# Patient Record
Sex: FEMALE | Race: WHITE | NOT HISPANIC OR LATINO | Employment: OTHER | URBAN - METROPOLITAN AREA
[De-identification: names, ages, dates, MRNs, and addresses within clinical notes are randomized per-mention and may not be internally consistent; named-entity substitution may affect disease eponyms.]

---

## 2017-10-27 PROBLEM — R13.10 DYSPHAGIA: Status: ACTIVE | Noted: 2017-10-27

## 2017-10-27 PROBLEM — I10 HYPERTENSION: Status: ACTIVE | Noted: 2017-10-27

## 2017-10-27 PROBLEM — I48.91 NEW ONSET ATRIAL FIBRILLATION: Status: ACTIVE | Noted: 2017-10-27

## 2017-10-27 PROBLEM — M54.2 CERVICALGIA: Status: ACTIVE | Noted: 2017-10-27

## 2017-11-27 PROBLEM — R94.39 ABNORMAL MYOCARDIAL PERFUSION STUDY: Status: ACTIVE | Noted: 2017-11-27

## 2019-04-26 ENCOUNTER — OFFICE VISIT (OUTPATIENT)
Dept: OPHTHALMOLOGY | Facility: CLINIC | Age: 84
End: 2019-04-26
Payer: MEDICARE

## 2019-04-26 DIAGNOSIS — B00.52 HERPES SIMPLEX VIRUS STROMAL KERATITIS: Primary | ICD-10-CM

## 2019-04-26 PROCEDURE — 99999 PR PBB SHADOW E&M-NEW PATIENT-LVL II: ICD-10-PCS | Mod: PBBFAC,,, | Performed by: OPHTHALMOLOGY

## 2019-04-26 PROCEDURE — 92004 COMPRE OPH EXAM NEW PT 1/>: CPT | Mod: S$PBB,,, | Performed by: OPHTHALMOLOGY

## 2019-04-26 PROCEDURE — 99202 OFFICE O/P NEW SF 15 MIN: CPT | Mod: PBBFAC | Performed by: OPHTHALMOLOGY

## 2019-04-26 PROCEDURE — 99999 PR PBB SHADOW E&M-NEW PATIENT-LVL II: CPT | Mod: PBBFAC,,, | Performed by: OPHTHALMOLOGY

## 2019-04-26 PROCEDURE — 92004 PR EYE EXAM, NEW PATIENT,COMPREHESV: ICD-10-PCS | Mod: S$PBB,,, | Performed by: OPHTHALMOLOGY

## 2019-04-26 RX ORDER — PREDNISOLONE ACETATE 10 MG/ML
1 SUSPENSION/ DROPS OPHTHALMIC 3 TIMES DAILY
Qty: 10 ML | Refills: 3 | Status: ON HOLD | OUTPATIENT
Start: 2019-04-26 | End: 2020-01-01 | Stop reason: HOSPADM

## 2019-04-26 RX ORDER — ACYCLOVIR 800 MG/1
800 TABLET ORAL 3 TIMES DAILY
Qty: 180 TABLET | Refills: 4 | Status: ON HOLD | OUTPATIENT
Start: 2019-04-26 | End: 2020-01-01 | Stop reason: HOSPADM

## 2019-04-26 NOTE — PROGRESS NOTES
HPI     Referred by Dr. Rajat Brantley    Patient here for a non healing ulcer.     Eye meds:  Valtrex TID PO  Ofloxacin Q2H    Last edited by Unique Combs on 4/26/2019  2:22 PM. (History)            Assessment /Plan     For exam results, see Encounter Report.    Herpes simplex virus stromal keratitis      Several week hx of redness and inflammation OD  Now with opacity and NV, remniscent of HSV keratitis with KP  On Valtrex TID, so add PF tid, cont Acyclovir 800mg  tid

## 2019-05-01 ENCOUNTER — OFFICE VISIT (OUTPATIENT)
Dept: OPHTHALMOLOGY | Facility: CLINIC | Age: 84
End: 2019-05-01
Payer: MEDICARE

## 2019-05-01 DIAGNOSIS — B00.52 HERPES SIMPLEX VIRUS STROMAL KERATITIS: Primary | ICD-10-CM

## 2019-05-01 PROCEDURE — 99999 PR PBB SHADOW E&M-EST. PATIENT-LVL II: CPT | Mod: PBBFAC,,, | Performed by: OPHTHALMOLOGY

## 2019-05-01 PROCEDURE — 92014 PR EYE EXAM, EST PATIENT,COMPREHESV: ICD-10-PCS | Mod: S$PBB,,, | Performed by: OPHTHALMOLOGY

## 2019-05-01 PROCEDURE — 99999 PR PBB SHADOW E&M-EST. PATIENT-LVL II: ICD-10-PCS | Mod: PBBFAC,,, | Performed by: OPHTHALMOLOGY

## 2019-05-01 PROCEDURE — 99212 OFFICE O/P EST SF 10 MIN: CPT | Mod: PBBFAC | Performed by: OPHTHALMOLOGY

## 2019-05-01 PROCEDURE — 92014 COMPRE OPH EXAM EST PT 1/>: CPT | Mod: S$PBB,,, | Performed by: OPHTHALMOLOGY

## 2019-05-01 RX ORDER — HYDROCHLOROTHIAZIDE 12.5 MG/1
12.5 TABLET ORAL DAILY
Status: ON HOLD | COMMUNITY
Start: 2019-04-23 | End: 2020-01-01 | Stop reason: HOSPADM

## 2019-05-01 NOTE — PROGRESS NOTES
HPI     Follow-up      Additional comments: Pt states that OD feels better but vision is till   blurry              Comments     Pt here for 1 week HSV follow up.    1. HSV Keratitis OD    MEDS:  PF TID OD  Acyclovir 800mg TID PO            Last edited by Hoda Viramontes MA on 5/1/2019  1:05 PM. (History)            Assessment /Plan     For exam results, see Encounter Report.    Herpes simplex virus stromal keratitis      Improving slowly on PF and Acyclovir tid  Still with some folds, few KP, and sectoral scar/NV  COnt PF tid, recheck few weeks, then back to Dr Brantley

## 2019-05-01 NOTE — LETTER
[facility]  Ophthalmology  [address]  [facility] [address]  Phone: [phone]  Fax: [phone]   May 1, 2019    Rajat Brantley, OD  [address] [address]    Patient: Yanira Salinas   MR Number: [id number]   YOB: 1932   Date of Visit: 5/1/2019       Dear Dr. Brantley :    Thank you for referring Yanira Salinas to me for evaluation. Here is my assessment and plan of care:     Herpes simplex virus stromal keratitis    Improving slowly on PF and Acyclovir tid  Still with some folds, few KP, and sectoral scar/NV  Cont PF tid, recheck few weeks, back to Dr Brantley    I would recommend a long slow taper of the PF over 8-10 weeks. If symptoms persist, a low dose steroid may need to be used chronically.     If you have questions, please do not hesitate to call me. I look forward to following Yanira Salinas along with you.    Sincerely,        Domo Freeman MD       CC  No Recipients

## 2020-10-04 PROBLEM — R07.9 CHEST PAIN: Status: ACTIVE | Noted: 2020-01-01

## 2020-10-05 PROBLEM — E87.6 HYPOKALEMIA: Status: ACTIVE | Noted: 2020-01-01

## 2020-10-05 PROBLEM — R63.4 WEIGHT LOSS, UNINTENTIONAL: Status: ACTIVE | Noted: 2020-01-01

## 2020-11-04 PROBLEM — N30.01 ACUTE CYSTITIS WITH HEMATURIA: Status: ACTIVE | Noted: 2020-01-01

## 2020-11-04 PROBLEM — K21.9 GASTROESOPHAGEAL REFLUX DISEASE WITHOUT ESOPHAGITIS: Chronic | Status: ACTIVE | Noted: 2020-01-01

## 2020-11-04 PROBLEM — I48.91 ATRIAL FIBRILLATION WITH RVR: Status: ACTIVE | Noted: 2020-01-01

## 2020-11-04 PROBLEM — E86.0 DEHYDRATION: Status: ACTIVE | Noted: 2020-01-01

## 2020-11-06 PROBLEM — R41.82 ALTERED MENTAL STATUS, UNSPECIFIED: Status: ACTIVE | Noted: 2020-01-01

## 2021-01-01 ENCOUNTER — EXTERNAL HOME HEALTH (OUTPATIENT)
Dept: HOME HEALTH SERVICES | Facility: HOSPITAL | Age: 86
End: 2021-01-01